# Patient Record
Sex: FEMALE | Race: WHITE | NOT HISPANIC OR LATINO | URBAN - METROPOLITAN AREA
[De-identification: names, ages, dates, MRNs, and addresses within clinical notes are randomized per-mention and may not be internally consistent; named-entity substitution may affect disease eponyms.]

---

## 2017-09-07 ENCOUNTER — LAB REQUISITION (OUTPATIENT)
Dept: LAB | Facility: HOSPITAL | Age: 32
End: 2017-09-07
Payer: COMMERCIAL

## 2017-09-07 ENCOUNTER — ALLSCRIPTS OFFICE VISIT (OUTPATIENT)
Dept: OTHER | Facility: OTHER | Age: 32
End: 2017-09-07

## 2017-09-07 DIAGNOSIS — Z01.419 ENCOUNTER FOR GYNECOLOGICAL EXAMINATION WITHOUT ABNORMAL FINDING: ICD-10-CM

## 2017-09-07 PROCEDURE — 87624 HPV HI-RISK TYP POOLED RSLT: CPT | Performed by: OBSTETRICS & GYNECOLOGY

## 2017-09-07 PROCEDURE — G0145 SCR C/V CYTO,THINLAYER,RESCR: HCPCS | Performed by: OBSTETRICS & GYNECOLOGY

## 2017-09-14 ENCOUNTER — GENERIC CONVERSION - ENCOUNTER (OUTPATIENT)
Dept: OTHER | Facility: OTHER | Age: 32
End: 2017-09-14

## 2017-09-14 LAB
HPV RRNA GENITAL QL NAA+PROBE: NORMAL
LAB AP GYN PRIMARY INTERPRETATION: NORMAL
Lab: NORMAL

## 2018-01-10 NOTE — MISCELLANEOUS
Message   Recorded as Task   Date: 03/07/2016 11:49 AM, Created By: Samia Bull   Task Name: Miscellaneous   Assigned To: Ruperto Camacho   Regarding Patient: Bello Feldman, Status: Active   CommentCarter Wes - 07 Mar 2016 11:49 AM     TASK CREATED  pt called, states she is having the same problem (abn bleeding with no actual period the time she is suppose to get it) Please call said you would order a test for her   Roger [place] - 08 Mar 2016 9:23 AM     TASK EDITED  Discussed-  BTB on Levora- needs a refill  Still having pain which is different from her IBS  She will be having hernia surgery with Dr Sujatha Loco  She desires no more children  A record release has been sent  I'll review asap, she'll check with the pharmacy to see what BCP she is acyually on and I will refill  May need a gyn procedure at the time of the hernia repair        RB        Signatures   Electronically signed by : EMY Davila ; Mar  8 2016  9:26AM EST                       (Author)

## 2018-01-12 NOTE — RESULT NOTES
Verified Results  (1) THIN PREP PAP WITH IMAGING [id number] 09:18AM Adelina Stephens Order Number: [id number]     Test Name Result Flag Reference   LAB AP CASE REPORT (Report)     Gynecologic Cytology Report            Case: [id number]                  Authorizing Provider: Kristy Salazar MD     Collected:      09/07/2017 0918        First Screen:     PATSY Medina    Received:      09/12/2017 1129        Specimen:  LIQUID-BASED PAP, SCREENING, Cervix   HPV HIGH RISK RESULT (Report)     HPV, High Risk: HPV NEG, HPV16 NEG, HPV18 NEG      Other High Risk HPV Negative, HPV 16 Negative, HPV 18 Negative  HPV types: 16,18,31,33,35,39,45,51,52,56,58,59,66 and 68 DNA are undetectable or below the pre-set threshold  [employer] FDA approved Emy 4800 is utilized with strict adherence to the manufacturers instruction  manual to test for the presence of High-Risk HPV DNA, as well as HPV 16 and HPV 18  This instrument  has been validated by our laboratory and/or by the   A negative result does not preclude the presence of HPV infection because results depend on adequate  specimen collection, absence of inhibitors and sufficient DNA to be detected  Additionally, HPV negative  results are not intended to prevent women from proceeding to colposcopy if clinically warranted  Positive HPV test results indicate the presence of any one or more of the high risk types, but since patients  are often co-infected with low-risk types it does not rule out the presence of low-risk types in patients  with mixed infections  LAB AP GYN PRIMARY INTERPRETATION      Negative for intraepithelial lesion or malignancy  Electronically signed by PATSY Medina on 9/14/2017 at 3:33 PM   LAB AP GYN SPECIMEN ADEQUACY      Satisfactory for evaluation  Endocervical/transformation zone component present     LAB AP GYN ADDITIONAL INFORMATION (Report)     [employer]'s FDA approved ,  and ThinPrep Imaging System are utilized with strict adherence to the 's instruction manual to   prepare gynecologic and non-gynecologic cytology specimens for the   production of ThinPrep slides as well as for gynecologic ThinPrep imaging  These processes have been validated by our laboratory and/or by the     The Pap test is not a diagnostic procedure and should not be used as the   sole means to detect cervical cancer  It is only a screening procedure to   aid in the detection of cervical cancer and its precursors  Both   false-negative and false-positive results have been experienced  Your   patient's test result should be interpreted in this context together with   the history and clinical findings

## 2018-01-14 VITALS
DIASTOLIC BLOOD PRESSURE: 62 MMHG | SYSTOLIC BLOOD PRESSURE: 102 MMHG | HEIGHT: 69 IN | HEART RATE: 61 BPM | OXYGEN SATURATION: 91 % | BODY MASS INDEX: 19.87 KG/M2 | WEIGHT: 134.13 LBS

## 2018-12-20 ENCOUNTER — ANNUAL EXAM (OUTPATIENT)
Dept: GYNECOLOGY | Facility: CLINIC | Age: 33
End: 2018-12-20
Payer: COMMERCIAL

## 2018-12-20 VITALS
SYSTOLIC BLOOD PRESSURE: 106 MMHG | HEIGHT: 70 IN | BODY MASS INDEX: 20.62 KG/M2 | DIASTOLIC BLOOD PRESSURE: 52 MMHG | WEIGHT: 144 LBS

## 2018-12-20 DIAGNOSIS — Z01.419 ENCOUNTER FOR ANNUAL ROUTINE GYNECOLOGICAL EXAMINATION: Primary | ICD-10-CM

## 2018-12-20 PROCEDURE — S0612 ANNUAL GYNECOLOGICAL EXAMINA: HCPCS | Performed by: OBSTETRICS & GYNECOLOGY

## 2018-12-20 RX ORDER — MELOXICAM 15 MG/1
TABLET ORAL
Refills: 3 | COMMUNITY
Start: 2018-11-19
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
## 2019-12-23 ENCOUNTER — ANNUAL EXAM (OUTPATIENT)
Dept: GYNECOLOGY | Facility: CLINIC | Age: 34
End: 2019-12-23
Payer: COMMERCIAL

## 2019-12-23 VITALS
DIASTOLIC BLOOD PRESSURE: 74 MMHG | HEIGHT: 69 IN | BODY MASS INDEX: 21.98 KG/M2 | WEIGHT: 148.4 LBS | SYSTOLIC BLOOD PRESSURE: 122 MMHG

## 2019-12-23 DIAGNOSIS — D25.2 SUBSEROUS LEIOMYOMA OF UTERUS: ICD-10-CM

## 2019-12-23 DIAGNOSIS — Z01.419 ENCOUNTER FOR ANNUAL ROUTINE GYNECOLOGICAL EXAMINATION: Primary | ICD-10-CM

## 2019-12-23 PROCEDURE — S0612 ANNUAL GYNECOLOGICAL EXAMINA: HCPCS | Performed by: OBSTETRICS & GYNECOLOGY

## 2019-12-23 RX ORDER — LANOLIN ALCOHOL/MO/W.PET/CERES
400 CREAM (GRAM) TOPICAL DAILY
COMMUNITY

## 2019-12-23 RX ORDER — PHENOL 1.4 %
AEROSOL, SPRAY (ML) MUCOUS MEMBRANE
COMMUNITY
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
## 2020-12-28 ENCOUNTER — ANNUAL EXAM (OUTPATIENT)
Dept: GYNECOLOGY | Facility: CLINIC | Age: 35
End: 2020-12-28
Payer: COMMERCIAL

## 2020-12-28 VITALS
SYSTOLIC BLOOD PRESSURE: 94 MMHG | WEIGHT: 142 LBS | DIASTOLIC BLOOD PRESSURE: 62 MMHG | HEIGHT: 69 IN | BODY MASS INDEX: 21.03 KG/M2

## 2020-12-28 DIAGNOSIS — F52.0 HYPOACTIVE SEXUAL DESIRE DISORDER: ICD-10-CM

## 2020-12-28 DIAGNOSIS — Z01.419 ENCOUNTER FOR ANNUAL ROUTINE GYNECOLOGICAL EXAMINATION: Primary | ICD-10-CM

## 2020-12-28 DIAGNOSIS — D25.2 SUBSEROUS LEIOMYOMA OF UTERUS: ICD-10-CM

## 2020-12-28 PROCEDURE — S0612 ANNUAL GYNECOLOGICAL EXAMINA: HCPCS | Performed by: OBSTETRICS & GYNECOLOGY

## 2021-05-06 ENCOUNTER — TELEPHONE (OUTPATIENT)
Dept: OBGYN CLINIC | Facility: CLINIC | Age: 36
End: 2021-05-06

## 2021-05-06 NOTE — TELEPHONE ENCOUNTER
Spoke to pt and let her know that it is in her chart no paper script is needed    ----- Message from Tracy Manning sent at 5/6/2021  3:16 PM EDT -----  Regarding: Non-Urgent Medical Question  Contact: [phone]  Good afternoon,     I plan on going for labwork next week and was wondering if the Prolactin level ordered by Dr Laura Strong could be mailed to me? I left the office back in December without receiving that lab order       Thank you,    Tk Abraham

## 2021-05-14 LAB — PROLACTIN SERPL-MCNC: 10.5 NG/ML (ref 4.8–23.3)

## 2021-05-17 NOTE — RESULT ENCOUNTER NOTE
The prolactin test is normal and in earlier TSH is normal as well  I believe in December I recommended you come to the office with your  to discuss the issue  I hope you are doing well

## 2022-01-04 PROBLEM — Z72.0 TOBACCO USE: Status: ACTIVE | Noted: 2022-01-04

## 2022-01-04 PROBLEM — D25.2 SUBSEROUS LEIOMYOMA OF UTERUS: Status: ACTIVE | Noted: 2022-01-04

## 2022-01-04 PROBLEM — N92.6 IRREGULAR MENSES: Status: ACTIVE | Noted: 2022-01-04

## 2022-01-04 PROBLEM — Z12.4 SCREENING FOR CERVICAL CANCER: Status: ACTIVE | Noted: 2022-01-04

## 2022-01-04 PROBLEM — F52.0 HYPOACTIVE SEXUAL DESIRE DISORDER: Status: ACTIVE | Noted: 2022-01-04
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
## 2022-01-05 ENCOUNTER — ANNUAL EXAM (OUTPATIENT)
Dept: OBGYN CLINIC | Facility: CLINIC | Age: 37
End: 2022-01-05
Payer: COMMERCIAL

## 2022-01-05 VITALS
BODY MASS INDEX: 21.18 KG/M2 | HEIGHT: 69 IN | DIASTOLIC BLOOD PRESSURE: 66 MMHG | SYSTOLIC BLOOD PRESSURE: 110 MMHG | WEIGHT: 143 LBS

## 2022-01-05 DIAGNOSIS — F52.0 HYPOACTIVE SEXUAL DESIRE DISORDER: ICD-10-CM

## 2022-01-05 DIAGNOSIS — Z12.4 SCREENING FOR CERVICAL CANCER: ICD-10-CM

## 2022-01-05 DIAGNOSIS — D25.2 SUBSEROUS LEIOMYOMA OF UTERUS: ICD-10-CM

## 2022-01-05 DIAGNOSIS — N92.6 IRREGULAR MENSES: ICD-10-CM

## 2022-01-05 DIAGNOSIS — Z01.419 ENCOUNTER FOR ANNUAL ROUTINE GYNECOLOGICAL EXAMINATION: Primary | ICD-10-CM

## 2022-01-05 DIAGNOSIS — Z72.0 TOBACCO USE: ICD-10-CM

## 2022-01-05 PROCEDURE — G0145 SCR C/V CYTO,THINLAYER,RESCR: HCPCS | Performed by: OBSTETRICS & GYNECOLOGY

## 2022-01-05 PROCEDURE — G0476 HPV COMBO ASSAY CA SCREEN: HCPCS | Performed by: OBSTETRICS & GYNECOLOGY

## 2022-01-05 PROCEDURE — S0612 ANNUAL GYNECOLOGICAL EXAMINA: HCPCS | Performed by: OBSTETRICS & GYNECOLOGY

## 2022-01-07 LAB
HPV HR 12 DNA CVX QL NAA+PROBE: NEGATIVE
HPV16 DNA CVX QL NAA+PROBE: NEGATIVE
HPV18 DNA CVX QL NAA+PROBE: NEGATIVE

## 2022-01-12 LAB
LAB AP GYN PRIMARY INTERPRETATION: NORMAL
LAB AP LMP: NORMAL
Lab: NORMAL

## 2022-10-12 PROBLEM — Z12.4 SCREENING FOR CERVICAL CANCER: Status: RESOLVED | Noted: 2022-01-04 | Resolved: 2022-10-12

## 2023-01-05 PROBLEM — Z87.42 HISTORY OF PCOS: Status: ACTIVE | Noted: 2023-01-05
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
## 2023-01-06 ENCOUNTER — ANNUAL EXAM (OUTPATIENT)
Dept: OBGYN CLINIC | Facility: CLINIC | Age: 38
End: 2023-01-06

## 2023-01-06 VITALS
HEIGHT: 68 IN | SYSTOLIC BLOOD PRESSURE: 102 MMHG | WEIGHT: 152.6 LBS | BODY MASS INDEX: 23.13 KG/M2 | DIASTOLIC BLOOD PRESSURE: 70 MMHG

## 2023-01-06 DIAGNOSIS — Z72.0 TOBACCO USE: ICD-10-CM

## 2023-01-06 DIAGNOSIS — N92.6 IRREGULAR MENSES: ICD-10-CM

## 2023-01-06 DIAGNOSIS — D25.2 SUBSEROUS LEIOMYOMA OF UTERUS: ICD-10-CM

## 2023-01-06 DIAGNOSIS — Z87.42 HISTORY OF PCOS: ICD-10-CM

## 2023-01-06 DIAGNOSIS — Z01.419 ENCOUNTER FOR ANNUAL ROUTINE GYNECOLOGICAL EXAMINATION: Primary | ICD-10-CM

## 2023-01-06 PROBLEM — F52.0 HYPOACTIVE SEXUAL DESIRE DISORDER: Status: RESOLVED | Noted: 2022-01-04 | Resolved: 2023-01-06

## 2023-01-06 RX ORDER — ESCITALOPRAM OXALATE 10 MG/1
TABLET ORAL
COMMUNITY
Start: 2022-12-18

## 2024-01-08 ENCOUNTER — ANNUAL EXAM (OUTPATIENT)
Dept: OBGYN CLINIC | Facility: CLINIC | Age: 39
End: 2024-01-08
Payer: COMMERCIAL

## 2024-01-08 VITALS
SYSTOLIC BLOOD PRESSURE: 120 MMHG | DIASTOLIC BLOOD PRESSURE: 72 MMHG | BODY MASS INDEX: 22.03 KG/M2 | HEIGHT: 70 IN | WEIGHT: 153.9 LBS

## 2024-01-08 DIAGNOSIS — Z87.42 HISTORY OF PCOS: ICD-10-CM

## 2024-01-08 DIAGNOSIS — Z01.419 ENCOUNTER FOR ANNUAL ROUTINE GYNECOLOGICAL EXAMINATION: Primary | ICD-10-CM

## 2024-01-08 DIAGNOSIS — N92.6 IRREGULAR MENSES: ICD-10-CM

## 2024-01-08 DIAGNOSIS — D25.2 SUBSEROUS LEIOMYOMA OF UTERUS: ICD-10-CM

## 2024-01-08 PROBLEM — Z72.0 TOBACCO USE: Status: RESOLVED | Noted: 2022-01-04 | Resolved: 2024-01-08

## 2024-01-08 PROCEDURE — 99395 PREV VISIT EST AGE 18-39: CPT | Performed by: OBSTETRICS & GYNECOLOGY
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
## 2024-02-21 PROBLEM — Z01.419 ENCOUNTER FOR ANNUAL ROUTINE GYNECOLOGICAL EXAMINATION: Status: RESOLVED | Noted: 2018-12-20 | Resolved: 2024-02-21

## 2024-06-10 ENCOUNTER — OFFICE VISIT (OUTPATIENT)
Dept: OBGYN CLINIC | Facility: CLINIC | Age: 39
End: 2024-06-10
Payer: COMMERCIAL

## 2024-06-10 VITALS — DIASTOLIC BLOOD PRESSURE: 80 MMHG | SYSTOLIC BLOOD PRESSURE: 116 MMHG | BODY MASS INDEX: 22.35 KG/M2 | WEIGHT: 154.4 LBS

## 2024-06-10 DIAGNOSIS — N64.4 BREAST PAIN, LEFT: Primary | ICD-10-CM

## 2024-06-10 PROCEDURE — 99213 OFFICE O/P EST LOW 20 MIN: CPT | Performed by: OBSTETRICS & GYNECOLOGY

## 2024-06-10 NOTE — PROGRESS NOTES
"Assessment/Plan:  Left breast pain    Fibrocystic breast changes noted.  No discrete mass  Recommend diagnostic mammogram and ultrasound       Diagnoses and all orders for this visit:    Breast pain, left  -     Mammo diagnostic left w 3d & cad; Future  -     US breast left limited (diagnostic); Future              Subjective:        Patient ID: Diana Perez is a 39 y.o. female.    Urgent visit made today.  Since June 1 or 2 she has noted left breast pain that she thought was coming from the nipple. Then she realized it was actually coming from under the areola. Discomfort and fullness at baseline low level. Pain worse \"with movement (a low-pressure touch, movement in bed, etc) I get a burning, shooting/stabbing pain. This is only on [her] left side. This has never happened before. [Her] last menstrual cycle started May 26th and only lasted for 2 days.\" She denies any trauma or nipple discharge. Pain sharp and from aprox the 3 o'clock to the 9 o'clock position. She has irregular periods at baseline. No family hx of breast cancer reported. No weight changes, no excessive sweating.          The following portions of the patient's history were reviewed and updated as appropriate: She  has a past medical history of Fibromyalgia and Vitamin D deficiency.  Patient Active Problem List    Diagnosis Date Noted    History of PCOS 01/05/2023    Subserous leiomyoma of uterus 01/04/2022    Irregular menses 01/04/2022     She  has a past surgical history that includes Hernia repair.  Her family history includes Heart attack in her father; Hypertension in her father; No Known Problems in her brother, daughter, and daughter.  She  reports that she quit smoking about 19 months ago. Her smoking use included cigarettes. She has never used smokeless tobacco. She reports current alcohol use of about 2.0 standard drinks of alcohol per week. She reports that she does not use drugs.  Current Outpatient Medications   Medication Sig " Dispense Refill    escitalopram (LEXAPRO) 10 mg tablet       calcium carbonate (OS-CORONA) 600 MG tablet Take by mouth (Patient not taking: Reported on 6/10/2024)      Cholecalciferol 125 MCG (5000 UT) capsule Take 5,000 Units by mouth daily (Patient not taking: Reported on 6/10/2024)      magnesium Oxide (MAG-OX) 400 mg TABS Take 400 mg by mouth daily (Patient not taking: Reported on 6/10/2024)      OMEGA-3 FATTY ACIDS PO Take 2 capsules by mouth  (Patient not taking: Reported on 6/10/2024)       No current facility-administered medications for this visit.     Current Outpatient Medications on File Prior to Visit   Medication Sig    escitalopram (LEXAPRO) 10 mg tablet     calcium carbonate (OS-CORONA) 600 MG tablet Take by mouth (Patient not taking: Reported on 6/10/2024)    Cholecalciferol 125 MCG (5000 UT) capsule Take 5,000 Units by mouth daily (Patient not taking: Reported on 6/10/2024)    magnesium Oxide (MAG-OX) 400 mg TABS Take 400 mg by mouth daily (Patient not taking: Reported on 6/10/2024)    OMEGA-3 FATTY ACIDS PO Take 2 capsules by mouth  (Patient not taking: Reported on 6/10/2024)     No current facility-administered medications on file prior to visit.     She is allergic to celecoxib and sulfamethoxazole..    Review of Systems   Constitutional:  Negative for chills, fatigue and fever.   HENT:  Negative for congestion, ear pain, hearing loss, rhinorrhea, sore throat and trouble swallowing.    Eyes:  Negative for pain and visual disturbance.   Respiratory:  Negative for cough and shortness of breath.    Cardiovascular:  Negative for chest pain.   Gastrointestinal:  Negative for constipation, diarrhea, nausea and vomiting.   Endocrine: Negative for polyuria.   Genitourinary:  Negative for difficulty urinating and dysuria.        L breast pain in the lower breast near nipple   Musculoskeletal:  Negative for arthralgias and myalgias.   Neurological:  Negative for dizziness, light-headedness and headaches.          Objective:    Vitals:    06/10/24 1305   BP: 116/80   BP Location: Left arm   Patient Position: Sitting   Cuff Size: Standard   Weight: 70 kg (154 lb 6.4 oz)            Physical Exam  Vitals and nursing note reviewed. Exam conducted with a chaperone present.   Constitutional:       General: She is not in acute distress.     Appearance: Normal appearance. She is normal weight. She is not ill-appearing.   Eyes:      General: No scleral icterus.  Chest:   Breasts:     Right: Normal.      Left: Normal.          Comments: Tender fibrocystic changes below the areola of the left breast at 6:00.  Lymphadenopathy:      Upper Body:      Right upper body: No supraclavicular, axillary or pectoral adenopathy.      Left upper body: No supraclavicular, axillary or pectoral adenopathy.   Neurological:      Mental Status: She is alert and oriented to person, place, and time.   Psychiatric:         Mood and Affect: Mood normal.         Behavior: Behavior normal.         Judgment: Judgment normal.

## 2024-06-11 ENCOUNTER — TELEPHONE (OUTPATIENT)
Age: 39
End: 2024-06-11

## 2024-06-11 DIAGNOSIS — N64.4 BREAST PAIN, LEFT: ICD-10-CM

## 2024-06-11 DIAGNOSIS — N64.4 BREAST PAIN: Primary | ICD-10-CM

## 2024-06-11 NOTE — TELEPHONE ENCOUNTER
Snehal from [facility]       Due to patients age they want a bilaterial mammogram script sent over by provider    She is in a gown now  at the office         Please fax to [phone].

## 2024-06-11 NOTE — TELEPHONE ENCOUNTER
Imaging calling again, they need the mammogram diagnostic bilateral script sent. Please advise and Please fax to [phone].

## 2025-02-23 NOTE — PROGRESS NOTES
Assessment/Plan:  Pap with high risk HPV testing every 5 years, if normal. Due 2027  Sexually transmitted infection testing as indicated. Declined.   Pelvic US ordered for irregular to heavy menses.   Exercise most days of week-minimum of 150-300 minutes per week.   Obtain appropriate diet and hydration.   Calcium 1000 mg (in divided doses-max 600 mg at one time) + 600 vit D daily.  Start birth control today since menses is irregular and partner had a vasectomy. Take as prescribed.   Rx sent to pharmacy on file.   Condom use encouraged for prevention of sexually transmitted infections.    Benefits, risks and alternatives of birth control discussed.  She denies cigarette smoking, high blood pressure, a history of DVT, known thrombogenic mutations, migraines with aura, breast cancer, or liver disease.   There are some drugs (such as certain anticonvulsants and antibiotics) that may decrease the contraceptive efficacy of OCPs, and she should call our office to confirm or use a backup method of contraception if taking these drugs.   Expect irregular bleeding for the first 3 months.   ACHES reviewed.     Exercise 150 minutes per week  minimum.    Return to office in 3 month (before birth control runs out) for follow up.   HPV 9 vaccine recommended through age 45. Check with your insurance for coverage. If covered, call office to schedule start of vaccine series.    Annual mammogram starting at age 40, monthly breast self exam recommended. Kegels 20 times twice daily.   Call your insurance company to verify coverage prior to completing any ordered tests.          1. Encntr for gyn exam (general) (routine) w/o abn findings  2. Irregular menses  -     US pelvis complete w transvaginal; Future; Expected date: 03/03/2025  -     levonorgestrel-ethinyl estradiol (Aviane) 0.1-20 MG-MCG per tablet; Take 1 tablet by mouth daily  3. History of vitamin D deficiency  -     Vitamin D 25 hydroxy; Future  4. HPV vaccine counseling              Subjective:      Patient ID: Diana Perez is a 39 y.o. female.    HPI    Diana Perez is a 39 y.o.  (19 yo girl, 12 yo girl) female who is here today for her annual visit. No gynecologic health concerns.   Hx of anxiety which is managed on lexapro.   Hx of vit d deficiency with last level of 15 in 2018 with no follow up. Not compliant with supplementation.   Irregular menses Q 30-80 x 2-9 days with varied flow occ accompanied by clots. Menses is not acceptable and unpredictable.  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     Diana Perez is sexually active with male partner/  of 14 years. Monogamous and feels safe in this relationship. Denies vaginal pain,bleeding  but does have intermittent dryness.  Occ lubricant used. Libido is diminishing.   She uses vasectomy for contraception.  Dislikes IUD contraception.   She is not interested in STD screening today.   She denies vaginal discharge, itching or pelvic pain.   She has no urinary concerns, does not have incontinence.  No bowel concerns.  No breast concerns.     Last pap: 2022 normal with negative HR HPV   Mammogram: Not on file   Colonoscopy: Not on file  DEXA scan: Not on file  HPV vaccine series:No    Family history of cancer:   Cancer-related family history is negative for Breast cancer and Ovarian cancer.      The following portions of the patient's history were reviewed and updated as appropriate: allergies, current medications, past family history, past medical history, past social history, past surgical history, and problem list.    Review of Systems   Constitutional: Negative.  Negative for activity change, appetite change, chills, diaphoresis, fatigue, fever and unexpected weight change.   HENT:  Negative for congestion, dental problem, sneezing, sore throat and trouble swallowing.    Eyes:  Negative for visual disturbance.   Respiratory:  Negative for chest  "tightness and shortness of breath.    Cardiovascular:  Negative for chest pain and leg swelling.   Gastrointestinal:  Negative for abdominal pain, constipation, diarrhea, nausea and vomiting.   Genitourinary:  Positive for menstrual problem. Negative for difficulty urinating, dyspareunia, dysuria, frequency, hematuria, pelvic pain, urgency, vaginal bleeding, vaginal discharge and vaginal pain.   Musculoskeletal:  Negative for back pain and neck pain.   Skin: Negative.    Allergic/Immunologic: Negative.    Neurological:  Negative for weakness and headaches.   Hematological:  Negative for adenopathy.   Psychiatric/Behavioral:  The patient is nervous/anxious.          Objective:      /80 (BP Location: Left arm, Patient Position: Sitting, Cuff Size: Standard)   Ht 5' 9\" (1.753 m)   Wt 70.2 kg (154 lb 12.8 oz)   LMP 02/09/2025 (Exact Date)   BMI 22.86 kg/m²          Physical Exam  Vitals and nursing note reviewed.   Constitutional:       Appearance: Normal appearance. She is well-developed.   HENT:      Head: Normocephalic and atraumatic.   Eyes:      General:         Right eye: No discharge.         Left eye: No discharge.   Neck:      Thyroid: No thyromegaly.      Trachea: Trachea normal.   Cardiovascular:      Rate and Rhythm: Normal rate and regular rhythm.      Heart sounds: Normal heart sounds.   Pulmonary:      Effort: Pulmonary effort is normal.      Breath sounds: Normal breath sounds.   Chest:   Breasts:     Breasts are symmetrical.      Right: Normal. No inverted nipple, mass, nipple discharge, skin change or tenderness.      Left: Normal. No inverted nipple, mass, nipple discharge, skin change or tenderness.   Abdominal:      Palpations: Abdomen is soft.   Genitourinary:     General: Normal vulva.      Exam position: Lithotomy position.      Labia:         Right: No rash, tenderness, lesion or injury.         Left: No rash, tenderness, lesion or injury.       Urethra: No prolapse, urethral pain, " urethral swelling or urethral lesion.      Vagina: Normal. No signs of injury and foreign body. No vaginal discharge, erythema, tenderness or bleeding.      Cervix: Normal.      Uterus: Enlarged (slighlty). Not deviated, not fixed, not tender and no uterine prolapse.       Adnexa:         Right: No mass, tenderness or fullness.          Left: No mass, tenderness or fullness.        Rectum: No external hemorrhoid.   Musculoskeletal:         General: Normal range of motion.      Cervical back: Normal range of motion and neck supple.   Lymphadenopathy:      Head:      Right side of head: No submental, submandibular or tonsillar adenopathy.      Left side of head: No submental, submandibular or tonsillar adenopathy.      Cervical: No cervical adenopathy.      Upper Body:      Right upper body: No supraclavicular or axillary adenopathy.      Left upper body: No supraclavicular or axillary adenopathy.      Lower Body: No right inguinal adenopathy. No left inguinal adenopathy.   Skin:     General: Skin is warm and dry.   Neurological:      Mental Status: She is alert and oriented to person, place, and time.   Psychiatric:         Mood and Affect: Mood normal.         Behavior: Behavior normal.

## 2025-02-24 ENCOUNTER — ANNUAL EXAM (OUTPATIENT)
Dept: OBGYN CLINIC | Facility: CLINIC | Age: 40
End: 2025-02-24
Payer: COMMERCIAL

## 2025-02-24 VITALS
WEIGHT: 154.8 LBS | DIASTOLIC BLOOD PRESSURE: 80 MMHG | BODY MASS INDEX: 22.93 KG/M2 | SYSTOLIC BLOOD PRESSURE: 112 MMHG | HEIGHT: 69 IN

## 2025-02-24 DIAGNOSIS — Z71.85 HPV VACCINE COUNSELING: ICD-10-CM

## 2025-02-24 DIAGNOSIS — Z01.419 ENCNTR FOR GYN EXAM (GENERAL) (ROUTINE) W/O ABN FINDINGS: Primary | ICD-10-CM

## 2025-02-24 DIAGNOSIS — Z86.39 HISTORY OF VITAMIN D DEFICIENCY: ICD-10-CM

## 2025-02-24 DIAGNOSIS — N92.6 IRREGULAR MENSES: ICD-10-CM

## 2025-02-24 PROCEDURE — 99395 PREV VISIT EST AGE 18-39: CPT | Performed by: NURSE PRACTITIONER

## 2025-02-24 RX ORDER — LEVONORGESTREL/ETHIN.ESTRADIOL 0.1-0.02MG
1 TABLET ORAL DAILY
Qty: 28 TABLET | Refills: 2 | Status: SHIPPED | OUTPATIENT
Start: 2025-02-24

## 2025-02-24 NOTE — PATIENT INSTRUCTIONS
Pap with high risk HPV testing every 5 years, if normal. Due 2027  Sexually transmitted infection testing as indicated. Declined.   Pelvic US ordered for irregular to heavy menses.   Exercise most days of week-minimum of 150-300 minutes per week.   Obtain appropriate diet and hydration.   Calcium 1000 mg (in divided doses-max 600 mg at one time) + 600 vit D daily.  Start birth control today since menses is irregular and partner had a vasectomy. Take as prescribed.   Rx sent to pharmacy on file.   Condom use encouraged for prevention of sexually transmitted infections.    Benefits, risks and alternatives of birth control discussed.  She denies cigarette smoking, high blood pressure, a history of DVT, known thrombogenic mutations, migraines with aura, breast cancer, or liver disease.   There are some drugs (such as certain anticonvulsants and antibiotics) that may decrease the contraceptive efficacy of OCPs, and she should call our office to confirm or use a backup method of contraception if taking these drugs.   Expect irregular bleeding for the first 3 months.   ACHES reviewed.     Exercise 150 minutes per week  minimum.    Return to office in 3 month (before birth control runs out) for follow up.   HPV 9 vaccine recommended through age 45. Check with your insurance for coverage. If covered, call office to schedule start of vaccine series.    Annual mammogram starting at age 40, monthly breast self exam recommended. Kegels 20 times twice daily.   Call your insurance company to verify coverage prior to completing any ordered tests.

## 2025-03-03 ENCOUNTER — HOSPITAL ENCOUNTER (OUTPATIENT)
Dept: ULTRASOUND IMAGING | Facility: HOSPITAL | Age: 40
Discharge: HOME | End: 2025-03-03
Payer: COMMERCIAL

## 2025-03-03 DIAGNOSIS — N92.6 IRREGULAR MENSES: ICD-10-CM

## 2025-03-03 PROCEDURE — 76830 TRANSVAGINAL US NON-OB: CPT

## 2025-03-03 PROCEDURE — 76856 US EXAM PELVIC COMPLETE: CPT

## 2025-03-07 ENCOUNTER — RESULTS FOLLOW-UP (OUTPATIENT)
Dept: OBGYN CLINIC | Facility: MEDICAL CENTER | Age: 40
End: 2025-03-07

## 2025-04-04 ENCOUNTER — TELEPHONE (OUTPATIENT)
Dept: OBGYN CLINIC | Facility: MEDICAL CENTER | Age: 40
End: 2025-04-04

## 2025-04-04 NOTE — TELEPHONE ENCOUNTER
"Spoke with Diana who developed a 4.3 mm \"brain clot between my sinuses\" less than one week after starting birth control. She developed a unilateral headache but had been hit in the head with a volleyball and thought that was the cause. She was evaluated in the ED a couple times before obtaining her diagnosis. Now has learned she has antiphospholipid syndrome. Following closely with PCP and soon neurology. She has discontinued her TRISTIAN.   "